# Patient Record
Sex: MALE | URBAN - METROPOLITAN AREA
[De-identification: names, ages, dates, MRNs, and addresses within clinical notes are randomized per-mention and may not be internally consistent; named-entity substitution may affect disease eponyms.]

---

## 2024-02-26 ENCOUNTER — DAC (OUTPATIENT)
Dept: URBAN - METROPOLITAN AREA SURGERY CENTER 15 | Facility: SURGERY CENTER | Age: 64
End: 2024-02-26

## 2024-03-28 ENCOUNTER — OV NP (OUTPATIENT)
Dept: URBAN - METROPOLITAN AREA CLINIC 23 | Facility: CLINIC | Age: 64
End: 2024-03-28
Payer: SELF-PAY

## 2024-03-28 VITALS
WEIGHT: 169 LBS | HEART RATE: 68 BPM | HEIGHT: 69 IN | SYSTOLIC BLOOD PRESSURE: 131 MMHG | TEMPERATURE: 97.9 F | BODY MASS INDEX: 25.03 KG/M2 | DIASTOLIC BLOOD PRESSURE: 74 MMHG

## 2024-03-28 DIAGNOSIS — Z85.038 PERSONAL HISTORY OF COLON CANCER: ICD-10-CM

## 2024-03-28 DIAGNOSIS — Z86.010 PERSONAL HISTORY OF COLONIC POLYPS: ICD-10-CM

## 2024-03-28 DIAGNOSIS — K63.5 POLYP OF SIGMOID COLON, UNSPECIFIED TYPE: ICD-10-CM

## 2024-03-28 PROCEDURE — 99205 OFFICE O/P NEW HI 60 MIN: CPT | Performed by: INTERNAL MEDICINE

## 2024-03-28 NOTE — HPI-TODAY'S VISIT:
64 yo male presenting for eval for PH colon cancer and personal colon polyps referred by Dr. Gonzalo Solis. A copy of this note will be sent to the referring physician The patient [place] and these were reviewed during the time of the office visit.Diagnosed on colonoscopy in 2021.  The patient was found on this colonoscopy to have rectal cancer.It appears that he initially underwent chemotherapy with FOLFOX and then radiation therapy extending into the spring 2022 prior to undergoing resection of the rectal area with Libertad pouch formation and a colostomy.  He then underwent colostomy reversal later in the year.The patient's treatment was complicated by chemotherapy/radiation induced enteritis which required a hospitalization for 2 months in [place] in 2022-it appears from the records that he underUnderwent a colonoscopy in June 2023 for follow-up.  From the translated report he underwent EMR of a large descending polyp with 3 clips placed.  However the pathology is not immediately available  [facility] [place] is his current oncologist

## 2024-05-01 ENCOUNTER — TELEPHONE ENCOUNTER (OUTPATIENT)
Dept: URBAN - METROPOLITAN AREA CLINIC 23 | Facility: CLINIC | Age: 64
End: 2024-05-01

## 2024-05-10 ENCOUNTER — TELEPHONE ENCOUNTER (OUTPATIENT)
Dept: URBAN - METROPOLITAN AREA CLINIC 12 | Facility: CLINIC | Age: 64
End: 2024-05-10

## 2024-05-10 ENCOUNTER — TELEPHONE ENCOUNTER (OUTPATIENT)
Dept: URBAN - METROPOLITAN AREA CLINIC 6 | Facility: CLINIC | Age: 64
End: 2024-05-10